# Patient Record
Sex: FEMALE | Race: WHITE | Employment: OTHER | URBAN - METROPOLITAN AREA
[De-identification: names, ages, dates, MRNs, and addresses within clinical notes are randomized per-mention and may not be internally consistent; named-entity substitution may affect disease eponyms.]

---

## 2017-05-15 ENCOUNTER — HOSPITAL ENCOUNTER (OUTPATIENT)
Age: 37
Discharge: HOME | End: 2017-05-15
Payer: COMMERCIAL

## 2017-05-15 VITALS
HEART RATE: 73 BPM | TEMPERATURE: 99 F | DIASTOLIC BLOOD PRESSURE: 67 MMHG | SYSTOLIC BLOOD PRESSURE: 109 MMHG | RESPIRATION RATE: 20 BRPM | WEIGHT: 130 LBS | HEIGHT: 66 IN | OXYGEN SATURATION: 100 % | BODY MASS INDEX: 20.89 KG/M2

## 2017-05-15 DIAGNOSIS — N89.8 VAGINAL DISCHARGE: Primary | ICD-10-CM

## 2017-05-15 PROCEDURE — 99204 OFFICE O/P NEW MOD 45 MIN: CPT

## 2017-05-15 PROCEDURE — 87491 CHLMYD TRACH DNA AMP PROBE: CPT | Performed by: NURSE PRACTITIONER

## 2017-05-15 PROCEDURE — 81025 URINE PREGNANCY TEST: CPT

## 2017-05-15 PROCEDURE — 87106 FUNGI IDENTIFICATION YEAST: CPT | Performed by: NURSE PRACTITIONER

## 2017-05-15 PROCEDURE — 87205 SMEAR GRAM STAIN: CPT | Performed by: NURSE PRACTITIONER

## 2017-05-15 PROCEDURE — 81002 URINALYSIS NONAUTO W/O SCOPE: CPT

## 2017-05-15 PROCEDURE — 87591 N.GONORRHOEAE DNA AMP PROB: CPT | Performed by: NURSE PRACTITIONER

## 2017-05-15 PROCEDURE — 87808 TRICHOMONAS ASSAY W/OPTIC: CPT | Performed by: NURSE PRACTITIONER

## 2017-05-15 RX ORDER — DOXEPIN HYDROCHLORIDE 50 MG/1
1 CAPSULE ORAL DAILY
COMMUNITY

## 2017-05-15 NOTE — ED INITIAL ASSESSMENT (HPI)
Reports vaginal burning and swelling x 10 days. Reports had intercourse with a new partner on 5/5/17. Used a latex condom and spermacide. Concerned regarding possibility of pregnancy/std.   Has been using a otc \"vaginal medication\" to treat possible va

## 2017-05-15 NOTE — ED PROVIDER NOTES
Patient presents with:  Cyrus-JONES (gynecologic)      HPI:     Nieves Richardson is a 39year old presents for vaginal irritation and discharge for the past 10 days.   Patient states she is , however she had sexual intercourse with another man and is concerne rashes  HEENT: atraumatic, normocephalic, ears and throat are clear  EYES: sclera non icteric, conjunctiva non-injected  NECK: supple, no adenopathy  LUNGS: clear to auscultation, no RRW  CARDIO: RRR without murmur  EXTREMITIES: no cyanosis, clubbing or ed her testing return. I will also refer her to a regular doctor to start care and follow-up with. Diagnosis:    ICD-10-CM    1. Vaginal discharge N89.8        All results reviewed and discussed with patient.   See AVS for detailed discharge instructions f

## (undated) NOTE — ED AVS SNAPSHOT
[facility] AND [facility] in [address] [address] [place]    Phone:  [phone]    Fax:  [phone]           Syd Marie   MRN: [id number]    Department:  [facility] AND [facility] in [address] [place] [address]   Date of Visit:  5/15 and physician's office to determine coverage and benefits available for follow-up care and referrals. It is our goal to assure that you are completely satisfied with every aspect of your visit today.   In an effort to constantly improve our service to y Any imaging studies and labs completed today can be reviewed in your MyChart account. You may have had testing done that requires us to contact you. Please make sure we have your correct phone number on file.      OUR CURRENT HOURS OF OPERATION:  MONDAY T and ask to get set up for an insurance coverage that is in-network with [facility]. [employer]     Sign up for Tel[employer], your secure online medical record.   [employer] will allow you to access patient instructions from your recent visit,  view